# Patient Record
Sex: FEMALE | URBAN - METROPOLITAN AREA
[De-identification: names, ages, dates, MRNs, and addresses within clinical notes are randomized per-mention and may not be internally consistent; named-entity substitution may affect disease eponyms.]

---

## 2017-09-14 ENCOUNTER — OFFICE VISIT (OUTPATIENT)
Dept: FAMILY MEDICINE | Facility: CLINIC | Age: 27
End: 2017-09-14
Payer: COMMERCIAL

## 2017-09-14 VITALS
BODY MASS INDEX: 22.62 KG/M2 | SYSTOLIC BLOOD PRESSURE: 107 MMHG | WEIGHT: 119.8 LBS | RESPIRATION RATE: 14 BRPM | HEIGHT: 61 IN | TEMPERATURE: 98.7 F | HEART RATE: 85 BPM | DIASTOLIC BLOOD PRESSURE: 75 MMHG | OXYGEN SATURATION: 100 %

## 2017-09-14 DIAGNOSIS — R10.32 LLQ ABDOMINAL PAIN: ICD-10-CM

## 2017-09-14 DIAGNOSIS — K21.9 GASTROESOPHAGEAL REFLUX DISEASE WITHOUT ESOPHAGITIS: Primary | ICD-10-CM

## 2017-09-14 DIAGNOSIS — K80.80 BILIARY CALCULUS OF OTHER SITE WITHOUT OBSTRUCTION: ICD-10-CM

## 2017-09-14 PROCEDURE — 99214 OFFICE O/P EST MOD 30 MIN: CPT | Performed by: PHYSICIAN ASSISTANT

## 2017-09-14 NOTE — PROGRESS NOTES
SUBJECTIVE:   Carmenza Han is a 26 year old female who presents to clinic today for the following health issues:      Concern - Abdominal Pain  Onset: x 6 months    Description:   LLQ abdominal pain x 6 months, gradual worsening over the past three months.  Pain is intermittent, seems to worse with movement throughout the day, much better with rest.  [place] ago.  Prior to her departure from [facility], she had US of RUQ done which showed  Non obstructing 8 mm gall stone ( she brought imaging with her today).  She was told to follow up on this when she is in [place].  She notes no RUQ pain, only LLQ pain and pain in epigastrium.  She does have associated belching and increased gas, and symptoms of epigastric discomfort do worsen with eating spicy foods.    Intensity: mild    Progression of Symptoms:  intermittent    Accompanying Signs & Symptoms:    Previous history of similar problem:     Precipitating factors:   Worsened by: walking,     Alleviating factors:  Improved by: rest    Therapies Tried and outcome: none          Problem list and histories reviewed & adjusted, as indicated.  Additional history: as documented    There is no problem list on file for this patient.    No past surgical history on file.    Social History   Substance Use Topics     Smoking status: Never Smoker     Smokeless tobacco: Never Used     Alcohol use No     No family history on file.      Current Outpatient Prescriptions   Medication Sig Dispense Refill     ranitidine (ZANTAC) 150 MG tablet Take 1 tablet (150 mg) by mouth At Bedtime 60 tablet 1     No Known Allergies      Reviewed and updated as needed this visit by clinical staff     Reviewed and updated as needed this visit by Provider         ROS:  Constitutional, HEENT, cardiovascular, pulmonary, gi and gu systems are negative, except as otherwise noted.      OBJECTIVE:   /75 (BP Location: Left arm, Patient Position: Chair,  "Cuff Size: Adult Small)  Pulse 85  Temp 98.7  F (37.1  C) (Tympanic)  Resp 14  Ht 5' 0.5\" (1.537 m)  Wt 119 lb 12.8 oz (54.3 kg)  LMP 09/05/2017  SpO2 100%  BMI 23.01 kg/m2  Body mass index is 23.01 kg/(m^2).  GENERAL: healthy, alert and no distress  RESP: lungs clear to auscultation - no rales, rhonchi or wheezes  CV: regular rate and rhythm, normal S1 S2, no S3 or S4, no murmur, click or rub  ABDOMEN: soft, TTP in LUQ, LLQ and epigastrium, no rebound or guarding, no hepatosplenomegaly, no masses and bowel sounds normal  PSYCH: mentation appears normal, affect normal/bright    Diagnostic Test Results:  none     ASSESSMENT/PLAN:     1. LLQ abdominal pain    US results from [facility] reviewed today. On exam, she has very mild diffuse tenderness along LLQ and LUQ and epigastrium, no RUQ TTP or ybarra's sign.  Vitals are stable.  I suspect that the cause of her pain is unrelated to her gallstone and likely secondary to gastritis or GERD.  Today we will start a trial of Zantac x 1 month.  Diet changes also discussed with patient. We will also get an US to follow up with the gallstone to see if there have been any changes ( symptomatic cholelithiasis/biliary colic) that could be related to the patients pain.  She is agreeable    2. Gastroesophageal reflux disease without esophagitis  - ranitidine (ZANTAC) 150 MG tablet; Take 1 tablet (150 mg) by mouth At Bedtime  Dispense: 60 tablet; Refill: 1    3. Biliary calculus of other site without obstruction  - US Abdomen Complete; Future    See Patient Instructions    Leo Dodge PA-C  [facility]  "

## 2017-09-14 NOTE — MR AVS SNAPSHOT
"              After Visit Summary   9/14/2017    Carmenza Han    MRN: [id number]           Patient Information     Date Of Birth          1990        Visit Information        Provider Department      9/14/2017 2:40 PM Leo Dodge PA-C [facility]        Today's Diagnoses     Gastroesophageal reflux disease without esophagitis    -  1    LLQ abdominal pain        Biliary calculus of other site without obstruction           Follow-ups after your visit        Future tests that were ordered for you today     Open Future Orders        Priority Expected Expires Ordered    US Abdomen Complete Routine  9/14/2018 9/14/2017            Who to contact     If you have questions or need follow up information about today's clinic visit or your schedule please contact [facility] directly at [phone].  Normal or non-critical lab and imaging results will be communicated to you by Advanced Image Enhancementhart, letter or phone within 4 business days after the clinic has received the results. If you do not hear from us within 7 days, please contact the clinic through MyChart or phone. If you have a critical or abnormal lab result, we will notify you by phone as soon as possible.  Submit refill requests through DashThis or call your pharmacy and they will forward the refill request to us. Please allow 3 business days for your refill to be completed.          Additional Information About Your Visit        MyChart Information     DashThis lets you send messages to your doctor, view your test results, renew your prescriptions, schedule appointments and more. To sign up, go to [email or web address] . Click on \"Log in\" on the left side of the screen, which will take you to the Welcome page. Then click on \"Sign up Now\" on the right side of the page.     You will be asked to enter the access code listed below, as well as some personal information. Please follow the directions to create " "your username and password.     Your access code is: [email or web address]  Expires: 2017  3:34 PM     Your access code will  in 90 days. If you need help or a new code, please call your [facility] clinic or [phone].        Care EveryWhere ID     This is your Care EveryWhere ID. This could be used by other organizations to access your [facility] medical records  [id number]        Your Vitals Were     Pulse Temperature Respirations Height Last Period Pulse Oximetry    85 98.7  F (37.1  C) (Tympanic) 14 5' 0.5\" (1.537 m) 2017 100%    BMI (Body Mass Index)                   23.01 kg/m2            Blood Pressure from Last 3 Encounters:   17 107/75    Weight from Last 3 Encounters:   17 119 lb 12.8 oz (54.3 kg)                 Today's Medication Changes          These changes are accurate as of: 17  3:34 PM.  If you have any questions, ask your nurse or doctor.               Start taking these medicines.        Dose/Directions    ranitidine 150 MG tablet   Commonly known as:  ZANTAC   Used for:  Gastroesophageal reflux disease without esophagitis   Started by:  Leo Dodge PA-C        Dose:  150 mg   Take 1 tablet (150 mg) by mouth At Bedtime   Quantity:  60 tablet   Refills:  1            Where to get your medicines      These medications were sent to [facility]/pharmacy #[id number] - [place], [place] - [address]  [address] [place] [address] [place] [address]     Phone:  [phone]     ranitidine 150 MG tablet                Primary Care Provider Office Phone # Fax #    Leo Dodge PA-C [phone]       [address]  [place] [address]        Equal Access to Services     [facility]: Hadii attila starkso Sojaya, waaxda luqadaha, qaybta kaalmada adeegyada, parker sanchez. So [facility] [phone].    ATENCIÓN: Si habla español, tiene a villalobos disposición servicios gratuitos de asistencia lingüística. Llame al " [phone].    We comply with applicable federal civil rights laws and [place] laws. We do not discriminate on the basis of race, color, national origin, age, disability sex, sexual orientation or gender identity.            Thank you!     Thank you for choosing [facility] [place]  for your care. Our goal is always to provide you with excellent care. Hearing back from our patients is one way we can continue to improve our services. Please take a few minutes to complete the written survey that you may receive in the mail after your visit with us. Thank you!             Your Updated Medication List - Protect others around you: Learn how to safely use, store and throw away your medicines at www.disposemymeds.org.          This list is accurate as of: 9/14/17  3:34 PM.  Always use your most recent med list.                   Brand Name Dispense Instructions for use Diagnosis    ranitidine 150 MG tablet    ZANTAC    60 tablet    Take 1 tablet (150 mg) by mouth At Bedtime    Gastroesophageal reflux disease without esophagitis

## 2017-09-21 ENCOUNTER — TELEPHONE (OUTPATIENT)
Dept: FAMILY MEDICINE | Facility: CLINIC | Age: 27
End: 2017-09-21

## 2017-09-21 ENCOUNTER — TRANSFERRED RECORDS (OUTPATIENT)
Dept: HEALTH INFORMATION MANAGEMENT | Facility: CLINIC | Age: 27
End: 2017-09-21

## 2017-09-21 DIAGNOSIS — K80.20 CALCULUS OF GALLBLADDER WITHOUT CHOLECYSTITIS WITHOUT OBSTRUCTION: Primary | ICD-10-CM

## 2017-09-22 NOTE — TELEPHONE ENCOUNTER
US shows an 11 mm focus in the gallbladder likely representing a stone vs. A polyp.  She also had imaging done [place] which was more convincing for gallbladder stone. Radiology is recommending US follow up in 6-12 months. With her abdominal pain, I think it is reasonable for her to consult with a surgeon for symptomatic cholelithiasis.  Please inform her of these results and information about surgical referral.  Please let me know if she has any questions

## 2017-09-22 NOTE — TELEPHONE ENCOUNTER
Patient informed and agreed with plan.  Referral information given  Patient to call [facility] for report and US imaging.  No further questions at this time    Your provider has referred you to: DARRYN: [facility] [place] [phone]   [email or web address]    Lisa Garcia RN

## 2017-09-22 NOTE — TELEPHONE ENCOUNTER
Attempt to reach patient. Received voice mail but unable to leave message.  Will need to try again.  Em Cuellar RN - Triage  [facility]

## 2017-09-25 ENCOUNTER — OFFICE VISIT (OUTPATIENT)
Dept: SURGERY | Facility: CLINIC | Age: 27
End: 2017-09-25
Payer: COMMERCIAL

## 2017-09-25 VITALS
OXYGEN SATURATION: 100 % | HEART RATE: 82 BPM | BODY MASS INDEX: 23.36 KG/M2 | WEIGHT: 119 LBS | DIASTOLIC BLOOD PRESSURE: 78 MMHG | HEIGHT: 60 IN | SYSTOLIC BLOOD PRESSURE: 132 MMHG

## 2017-09-25 DIAGNOSIS — R10.12 LUQ ABDOMINAL PAIN: Primary | ICD-10-CM

## 2017-09-25 PROCEDURE — 99203 OFFICE O/P NEW LOW 30 MIN: CPT | Performed by: SURGERY

## 2017-09-25 ASSESSMENT — ENCOUNTER SYMPTOMS: ABDOMINAL PAIN: 1

## 2017-09-25 NOTE — PROGRESS NOTES
Chief complaint:  Abdominal pain    HPI:  This patient is a 26 year old  female who presents with left upper quadrant left lower quadrant abdominal pain.  The pain is intermittent.  She recently came here from [place] and in [place] was diagnosed with an 8 mm gallstone on ultrasound and was told to have it removed after she arrived here.  Her symptoms however were primarily left lower and left upper quadrant pain and somewhat in the epigastrium but never in the right upper quadrant.  She was started on medication for possible gastritis/GERD as her symptoms didn't correlate well with cholelithiasis/biliary colic.  She reports that she's had substantial symptom improvement on medical therapy.  She understands that her symptoms don't correlate well with gallbladder disease.  She believes that her symptoms are related to stress or possibly to carrying her 2-year-old child which she normally does on her left side.  She is here with her  and daughter    Past Medical History:   has no past medical history on file.    Past Surgical History:  No past surgical history on file. no abdominal surgery     Social History:  Social History     Social History     Marital status:      Spouse name: N/A     Number of children: N/A     Years of education: N/A     Occupational History     Not on file.     Social History Main Topics     Smoking status: Never Smoker     Smokeless tobacco: Never Used     Alcohol use No     Drug use: No     Sexual activity: No     Other Topics Concern     Not on file     Social History Narrative     No narrative on file        Family History:  No family history on file. no family history of gallbladder disease    Review of Systems:  The 10 point Review of Systems is negative other than noted in the HPI and above.    Physical Exam:  General - This is a well developed, well nourished female in no apparent distress.  HEENT - Normocephalic, atraumatic.  NO scleral icterus.  Neck - supple without  "masses  Lungs - respirations regular and non-labored.    Abdomen:   soft, non-distended with possibly slight tenderness noted in the left upper quadrant and left mid abdomen. no masses palpated. .  Extremities - warm without edema  Neurologic - nonfocal    Relevant labs:  None    Imaging:  Ultrasound RUQ:\" immobile, echogenic, non-shadowing mass in GB stone vs tumefactive sludge vs polyp\", negative gallbladder wall thickening, negative ductal dilatation (2mm), negative pericholecystic fluid, normal liver.  Previous ultrasound in [place] shows the stone to be mobile within the lumen of the gallbladder (not consistent with a polyp)    Assessment and Plan:  It is my impression that she has tumefactive sludge versus a stone but no clear symptoms related to it.  We did discuss the possibility that this represents a polyp and that polyps over 1 cm are considered for removal due to increased risk for malignancy.  Since her symptoms are much improved on medicine for gastritis/PUD she is not interested in pursuing cholecystectomy.  She understands that radiology has recommended a follow-up ultrasound in 6-12 months to check on this polyp/sludge/ gallstone.  I have offered her a Laparoscopic cholecystectomy with cholangiograms.  We have discussed the indication, risks and expected recovery.  Risks discussed included duct/ organ injury, conversion to open surgery with increased recovery, post cholecystectomy syndromes and their treatment.  He understands that cholecystectomy may not resolve her symptoms.   Literature was given to review.  She'll contact her primary care physician to see whether further evaluation can be arranged through gastroenterology, possibly including EGD.  I also discussed the alternative of CT scanning to evaluate for her abdominal pain.    Xander Nagel MD  [facility] [place]    Please route or send letter to:  Primary Care Provider (PCP) and Include Progress Note  "

## 2017-09-25 NOTE — MR AVS SNAPSHOT
"              After Visit Summary   2017    Carmenza Han    MRN: [id number]           Patient Information     Date Of Birth          1990        Visit Information        Provider Department      2017 1:30 PM Xander Nagel MD [facility] General Surgery      Today's Diagnoses     LUQ abdominal pain    -  1       Follow-ups after your visit        Who to contact     If you have questions or need follow up information about today's clinic visit or your schedule please contact [facility] directly at [phone].  Normal or non-critical lab and imaging results will be communicated to you by Pruffihart, letter or phone within 4 business days after the clinic has received the results. If you do not hear from us within 7 days, please contact the clinic through ShwrÃ¼mt or phone. If you have a critical or abnormal lab result, we will notify you by phone as soon as possible.  Submit refill requests through gloStream or call your pharmacy and they will forward the refill request to us. Please allow 3 business days for your refill to be completed.          Additional Information About Your Visit        MyChart Information     gloStream lets you send messages to your doctor, view your test results, renew your prescriptions, schedule appointments and more. To sign up, go to [email or web address] . Click on \"Log in\" on the left side of the screen, which will take you to the Welcome page. Then click on \"Sign up Now\" on the right side of the page.     You will be asked to enter the access code listed below, as well as some personal information. Please follow the directions to create your username and password.     Your access code is: [email or web address]  Expires: 2017  3:34 PM     Your access code will  in 90 days. If you need help or a new code, please call your [place] clinic or [phone].        Care EveryWhere ID     " This is your Care EveryWhere ID. This could be used by other organizations to access your [facility] medical records  [id number]        Your Vitals Were     Pulse Height Last Period Pulse Oximetry BMI (Body Mass Index)       82 5' (1.524 m) 09/05/2017 100% 23.24 kg/m2        Blood Pressure from Last 3 Encounters:   09/25/17 132/78   09/14/17 107/75    Weight from Last 3 Encounters:   09/25/17 119 lb (54 kg)   09/14/17 119 lb 12.8 oz (54.3 kg)              Today, you had the following     No orders found for display       Primary Care Provider Office Phone # Fax #    Leo Dodge PA-C [phone]       [address] [place] [facility] [place] [address]        Equal Access to Services     [facility]: Hadii aad ku hadasho Soomaali, waaxda luqadaha, qaybta kaalmada adeegyada, waxshira dobbs haybetsey ya . So [facility] [phone].    ATENCIÓN: Si habla español, tiene a villalobos disposición servicios gratuitos de asistencia lingüística. Talon al [phone].    We comply with applicable federal civil rights laws and [place] laws. We do not discriminate on the basis of race, color, national origin, age, disability sex, sexual orientation or gender identity.            Thank you!     Thank you for choosing [facility] [place]  for your care. Our goal is always to provide you with excellent care. Hearing back from our patients is one way we can continue to improve our services. Please take a few minutes to complete the written survey that you may receive in the mail after your visit with us. Thank you!             Your Updated Medication List - Protect others around you: Learn how to safely use, store and throw away your medicines at www.disposemymeds.org.          This list is accurate as of: 9/25/17  2:09 PM.  Always use your most recent med list.                   Brand Name Dispense Instructions for use Diagnosis    ranitidine 150 MG tablet    ZANTAC    60 tablet    Take 1 tablet  (150 mg) by mouth At Bedtime    Gastroesophageal reflux disease without esophagitis

## 2017-09-25 NOTE — LETTER
"2017    RE:  Carmenza Han-:  [birth year]    Chief complaint:  Abdominal pain     HPI:  This patient is a 26 year old  female who presents with left upper quadrant left lower quadrant abdominal pain.  The pain is intermittent.  She recently came here from [place] and in [place] was diagnosed with an 8 mm gallstone on ultrasound and was told to have it removed after she arrived here.  Her symptoms however were primarily left lower and left upper quadrant pain and somewhat in the epigastrium but never in the right upper quadrant.  She was started on medication for possible gastritis/GERD as her symptoms didn't correlate well with cholelithiasis/biliary colic.  She reports that she's had substantial symptom improvement on medical therapy.  She understands that her symptoms don't correlate well with gallbladder disease.  She believes that her symptoms are related to stress or possibly to carrying her 2-year-old child which she normally does on her left side.  She is here with her  and daughter     Past Medical History:  Has no past medical history on file.     No abdominal surgery                   No family history of gallbladder disease     Review of Systems:  The 10 point Review of Systems is negative other than noted in the HPI and above.     Physical Exam:  General - This is a well developed, well nourished female in no apparent distress.  HEENT - Normocephalic, atraumatic.  NO scleral icterus.  Neck - supple without masses  Lungs - respirations regular and non-labored.    Abdomen:                        soft, non-distended with possibly slight tenderness noted in the left upper quadrant and left mid abdomen. no masses palpated. .  Extremities - warm without edema  Neurologic - nonfocal     Relevant labs:  None     Imaging:  Ultrasound RUQ:\" immobile, echogenic, non-shadowing mass in GB stone vs tumefactive sludge vs polyp\", negative gallbladder wall thickening, negative ductal " dilatation (2mm), negative pericholecystic fluid, normal liver.  Previous ultrasound in [place] shows the stone to be mobile within the lumen of the gallbladder (not consistent with a polyp)     Assessment and Plan:  It is my impression that she has tumefactive sludge versus a stone but no clear symptoms related to it.  We did discuss the possibility that this represents a polyp and that polyps over 1 cm are considered for removal due to increased risk for malignancy.  Since her symptoms are much improved on medicine for gastritis/PUD she is not interested in pursuing cholecystectomy.  She understands that radiology has recommended a follow-up ultrasound in 6-12 months to check on this polyp/sludge/ gallstone.  I have offered her a Laparoscopic cholecystectomy with cholangiograms.  We have discussed the indication, risks and expected recovery.  Risks discussed included duct/ organ injury, conversion to open surgery with increased recovery, post cholecystectomy syndromes and their treatment.  He understands that cholecystectomy may not resolve her symptoms.   Literature was given to review.  She'll contact her primary care physician to see whether further evaluation can be arranged through gastroenterology, possibly including EGD.  I also discussed the alternative of CT scanning to evaluate for her abdominal pain.     Xander Nagel MD  [facility] [place]

## 2017-10-10 ENCOUNTER — OFFICE VISIT (OUTPATIENT)
Dept: FAMILY MEDICINE | Facility: CLINIC | Age: 27
End: 2017-10-10
Payer: COMMERCIAL

## 2017-10-10 VITALS
DIASTOLIC BLOOD PRESSURE: 74 MMHG | TEMPERATURE: 98.8 F | HEIGHT: 60 IN | BODY MASS INDEX: 23.25 KG/M2 | HEART RATE: 78 BPM | RESPIRATION RATE: 16 BRPM | OXYGEN SATURATION: 100 % | WEIGHT: 118.4 LBS | SYSTOLIC BLOOD PRESSURE: 105 MMHG

## 2017-10-10 DIAGNOSIS — K21.9 GASTROESOPHAGEAL REFLUX DISEASE WITHOUT ESOPHAGITIS: Primary | ICD-10-CM

## 2017-10-10 PROCEDURE — 99213 OFFICE O/P EST LOW 20 MIN: CPT | Performed by: PHYSICIAN ASSISTANT

## 2017-10-10 NOTE — MR AVS SNAPSHOT
After Visit Summary   10/10/2017    Carmenza Han    MRN: [id number]           Patient Information     Date Of Birth          1990        Visit Information        Provider Department      10/10/2017 1:40 PM Leo Dodge PA-C [facility]        Today's Diagnoses     Gastroesophageal reflux disease without esophagitis    -  1      Care Instructions                Heartburn/GERD   What is heartburn?   Heartburn refers to the symptoms you feel when acids in your stomach flow back into the esophagus. (The esophagus is the tube that carries food from your throat to your stomach.) This backward movement of stomach acid is called reflux. The acid can burn and irritate the esophagus, throat, and vocal cords.   Heartburn is a common problem. Despite its name, it has nothing to do with the heart.   When you have heartburn often, you may have a condition called gastroesophageal reflux disease, or GERD.   How does it occur?   At the bottom of the esophagus there is a ring of muscle called a sphincter. It acts like a valve. When you swallow food, the sphincter opens to let the food pass into the stomach. The ring then closes to keep the stomach contents from going back into the esophagus. If the sphincter is weak or too relaxed, stomach acid and food flow backward into the esophagus. Because the esophagus does not have the protective lining that the stomach has, the acid causes pain.   The sphincter muscle sometimes does not work properly if:   You are overweight.   You are pregnant.   You have a hiatal hernia (a condition in which part of the stomach protrudes through the diaphragm into the chest).   You eat too much.   You lie down soon after eating.   You wear tight clothes that push on your stomach.   Foods that may make heartburn worse are:   foods high in fat   sugar   chocolate   peppermint   onions   citrus foods such as orange juice   tomato-based foods    spicy foods   coffee and other drinks with caffeine, such as tea and margaret   alcohol.   Heartburn can also be made worse by:   taking certain medicines, such as aspirin   smoking cigarettes.   Anyone can have an attack of heartburn from overeating or eating foods that are high in acid. Most of the time heartburn is mild and lasts for a short time. There is usually not a problem when heartburn occurs just once in a while. You should see your healthcare provider if:   You have heartburn nearly every day for 2 weeks.   The heartburn comes back when the antacid wears off.   Heartburn wakes you up at night.   What are the symptoms?   The main symptom of heartburn is a burning pain in the lower chest, usually close to the bottom of the breastbone. Other symptoms you may have are:   acid or sour taste in your mouth   belching   a feeling of bloating or fullness in the stomach.   These symptoms tend to happen after very large meals and especially with activity such as bending or lifting after meals. The symptoms may be made worse by lying down or by wearing tight clothing.   Heartburn is very common during the last few months of pregnancy. The weight of the baby pushes on the stomach and can cause the sphincter to relax and let acid to flow back into the esophagus.   How is it diagnosed?   Usually heartburn can be diagnosed from your medical history.   If there is any question about the diagnosis, you may have the following tests to check for ulcers or other problems that might cause your symptoms:   barium swallow X-ray study of the esophagus   complete upper GI (gastrointestinal) barium X-ray study of the esophagus, stomach, and upper intestine   endoscopy, a procedure in which a thin flexible tube with a tiny camera is placed in your mouth and down into your stomach so your provider can see your esophagus and stomach.   How is it treated?   To help reduce the symptoms of heartburn you can:   Try not to put a lot of  pressure on the sphincter muscle. Eating light meals and wearing loose clothing will help.   Lose weight if you are overweight.   Take nonprescription antacids (tablets or liquid) after meals and at bedtime.   Raise the head of your bed or use more than one pillow so your head is higher than your stomach. This may allow gravity to help keep food from backing up.   If you find that certain foods or drinks seem to cause your symptoms or make them worse, avoid those foods.   If the simple measures described above do not relieve the symptoms, your healthcare provider may prescribe medicine. The prescription medicines help reduce stomach acid. They also help stomach emptying. A very few people who are not helped with medicines may need surgery.   Get emergency care if the following symptoms occur with the heartburn and do not go away within 15 minutes of treatment for heartburn: shortness of breath; sweating; light-headedness, weakness; or jaw, arm, back, or chest pain.   How long will the effects last?   Heartburn symptoms are usually relieved by treatment in just a few hours or less. If you are having heartburn every day, starting treatment will usually relieve the symptoms in a few days. However, the symptoms may come back from time to time, especially if you gain weight.   Heartburn can sometimes make asthma worse. If you have asthma, preventing or controlling heartburn may help control your asthma symptoms.   How can I help prevent heartburn?   The best prevention is to:   Keep a healthy weight. Lose weight if you are overweight.   Sleep with your head elevated at least 4 to 6 inches. (It's usually most comfortable to put the head of your bed on blocks.)   It may also help if you:   Wait an hour or longer after eating before you lie down. If you have to lie down after a meal, lie on your left side. Keep your head and shoulders slightly higher than the rest of your body. It's best to not eat for 2 to 3 hours before  "you go to bed.   Eat smaller, more frequent meals.   Avoid wearing tight clothing or belts.   Don't smoke. Smoking relaxes the sphincter leading to your stomach.   Avoid foods and other things that seem to cause heartburn or make it worse.   Developed by [employer].   Published by [employer].   Last modified: 2009   Last reviewed: 2008             Follow-ups after your visit        Who to contact     If you have questions or need follow up information about today's clinic visit or your schedule please contact [facility] [place] directly at [phone].  Normal or non-critical lab and imaging results will be communicated to you by soneshart, letter or phone within 4 business days after the clinic has received the results. If you do not hear from us within 7 days, please contact the clinic through [employer] or phone. If you have a critical or abnormal lab result, we will notify you by phone as soon as possible.  Submit refill requests through [employer] or call your pharmacy and they will forward the refill request to us. Please allow 3 business days for your refill to be completed.          Additional Information About Your Visit        MyChart Information     [employer] lets you send messages to your doctor, view your test results, renew your prescriptions, schedule appointments and more. To sign up, go to [email or web address] . Click on \"Log in\" on the left side of the screen, which will take you to the Welcome page. Then click on \"Sign up Now\" on the right side of the page.     You will be asked to enter the access code listed below, as well as some personal information. Please follow the directions to create your username and password.     Your access code is: [email or web address]  Expires: 2017  3:34 PM     Your access code will  in 90 days. If you need help or a new code, please call your [place] clinic or [phone].        Care EveryWhere ID     This is your Care EveryWhere ID. This " could be used by other organizations to access your [place] medical records  [id number]        Your Vitals Were     Pulse Temperature Respirations Height Last Period Pulse Oximetry    78 98.8  F (37.1  C) (Tympanic) 16 5' (1.524 m) 10/05/2017 100%    BMI (Body Mass Index)                   23.12 kg/m2            Blood Pressure from Last 3 Encounters:   10/10/17 105/74   09/25/17 132/78   09/14/17 107/75    Weight from Last 3 Encounters:   10/10/17 118 lb 6.4 oz (53.7 kg)   09/25/17 119 lb (54 kg)   09/14/17 119 lb 12.8 oz (54.3 kg)              Today, you had the following     No orders found for display         Where to get your medicines      These medications were sent to [facility]/pharmacy #[id number] - [place], [place] - [address]  [address], [place] [address]     Phone:  [phone]     ranitidine 150 MG tablet          Primary Care Provider Office Phone # Fax #    Leo Dodge PA-C [phone]       [address]  [place] [address]        Equal Access to Services     [facility]: Hadii aad ku hadasho Soomaali, waaxda luqadaha, qaybta kaalmada adeegyada, waxay idiin hayjossien [place] ya . So [facility] [phone].    ATENCIÓN: Si habla español, tiene a villalobos disposición servicios gratuitos de asistencia lingüística. Llame al [phone].    We comply with applicable federal civil rights laws and [place] laws. We do not discriminate on the basis of race, color, national origin, age, disability, sex, sexual orientation, or gender identity.            Thank you!     Thank you for choosing [facility] [place]  for your care. Our goal is always to provide you with excellent care. Hearing back from our patients is one way we can continue to improve our services. Please take a few minutes to complete the written survey that you may receive in the mail after your visit with us. Thank you!             Your Updated Medication List - Protect others  around you: Learn how to safely use, store and throw away your medicines at www.disposemymeds.org.          This list is accurate as of: 10/10/17  1:51 PM.  Always use your most recent med list.                   Brand Name Dispense Instructions for use Diagnosis    ranitidine 150 MG tablet    ZANTAC    90 tablet    Take 1 tablet (150 mg) by mouth At Bedtime    Gastroesophageal reflux disease without esophagitis

## 2017-10-10 NOTE — PROGRESS NOTES
SUBJECTIVE:   Carmenza Han is a 26 year old female who presents to clinic today for the following health issues:      Follow Up For Abdominal Pain  Onset: Last OV 9/14/2017    Description:   Was seen by general surgery to evaluate persistent cholelithiasis seen on RUQ on.  She has been experiencing left sided and epigastric abdominal pain, increased belching and association with eating spicy foods.  She was started on zantac 150 mg at our last visit.  Per gen surg, she was offered lap beatriz to see if this would help given the finding of a stone vs. Polyp, but declined as pain was improving with zantac.  She returns today, noting that her GERD symptoms have been greatly improved and she is essentially now asymptomatic.  Would like a refill today.  Planning to have repeat US in 6 months as per surgery recs.    Intensity: mild    Progression of Symptoms:  improving    Accompanying Signs & Symptoms:  See above    Previous history of similar problem:   yes    Precipitating factors:   Worsened by: see above    Alleviating factors:  Improved by: see above    Therapies Tried and outcome: Zantac- helps          Problem list and histories reviewed & adjusted, as indicated.  Additional history: as documented    Patient Active Problem List   Diagnosis     Gastroesophageal reflux disease without esophagitis     No past surgical history on file.    Social History   Substance Use Topics     Smoking status: Never Smoker     Smokeless tobacco: Never Used     Alcohol use No     Family History   Problem Relation Age of Onset     DIABETES Mother      Hypertension Father          Current Outpatient Prescriptions   Medication Sig Dispense Refill     ranitidine (ZANTAC) 150 MG tablet Take 1 tablet (150 mg) by mouth At Bedtime 90 tablet 1     No Known Allergies      Reviewed and updated as needed this visit by clinical staff     Reviewed and updated as needed this visit by Provider         ROS:  Constitutional, HEENT,  cardiovascular, pulmonary, gi and gu systems are negative, except as otherwise noted.      OBJECTIVE:   /74 (BP Location: Right arm, Patient Position: Chair, Cuff Size: Adult Regular)  Pulse 78  Temp 98.8  F (37.1  C) (Tympanic)  Resp 16  Ht 5' (1.524 m)  Wt 118 lb 6.4 oz (53.7 kg)  LMP 10/05/2017  SpO2 100%  BMI 23.12 kg/m2  Body mass index is 23.12 kg/(m^2).  GENERAL: healthy, alert and no distress  PSYCH: mentation appears normal, affect normal/bright    Diagnostic Test Results:  none     ASSESSMENT/PLAN:       1. Gastroesophageal reflux disease without esophagitis  - ranitidine (ZANTAC) 150 MG tablet; Take 1 tablet (150 mg) by mouth At Bedtime  Dispense: 90 tablet; Refill: 1    See Patient Instructions    Leo Dodge PA-C  [facility] [place]

## 2017-10-10 NOTE — PATIENT INSTRUCTIONS
Heartburn/GERD   What is heartburn?   Heartburn refers to the symptoms you feel when acids in your stomach flow back into the esophagus. (The esophagus is the tube that carries food from your throat to your stomach.) This backward movement of stomach acid is called reflux. The acid can burn and irritate the esophagus, throat, and vocal cords.   Heartburn is a common problem. Despite its name, it has nothing to do with the heart.   When you have heartburn often, you may have a condition called gastroesophageal reflux disease, or GERD.   How does it occur?   At the bottom of the esophagus there is a ring of muscle called a sphincter. It acts like a valve. When you swallow food, the sphincter opens to let the food pass into the stomach. The ring then closes to keep the stomach contents from going back into the esophagus. If the sphincter is weak or too relaxed, stomach acid and food flow backward into the esophagus. Because the esophagus does not have the protective lining that the stomach has, the acid causes pain.   The sphincter muscle sometimes does not work properly if:   You are overweight.   You are pregnant.   You have a hiatal hernia (a condition in which part of the stomach protrudes through the diaphragm into the chest).   You eat too much.   You lie down soon after eating.   You wear tight clothes that push on your stomach.   Foods that may make heartburn worse are:   foods high in fat   sugar   chocolate   peppermint   onions   citrus foods such as orange juice   tomato-based foods   spicy foods   coffee and other drinks with caffeine, such as tea and margaret   alcohol.   Heartburn can also be made worse by:   taking certain medicines, such as aspirin   smoking cigarettes.   Anyone can have an attack of heartburn from overeating or eating foods that are high in acid. Most of the time heartburn is mild and lasts for a short time. There is usually not a problem when heartburn occurs just once in a  while. You should see your healthcare provider if:   You have heartburn nearly every day for 2 weeks.   The heartburn comes back when the antacid wears off.   Heartburn wakes you up at night.   What are the symptoms?   The main symptom of heartburn is a burning pain in the lower chest, usually close to the bottom of the breastbone. Other symptoms you may have are:   acid or sour taste in your mouth   belching   a feeling of bloating or fullness in the stomach.   These symptoms tend to happen after very large meals and especially with activity such as bending or lifting after meals. The symptoms may be made worse by lying down or by wearing tight clothing.   Heartburn is very common during the last few months of pregnancy. The weight of the baby pushes on the stomach and can cause the sphincter to relax and let acid to flow back into the esophagus.   How is it diagnosed?   Usually heartburn can be diagnosed from your medical history.   If there is any question about the diagnosis, you may have the following tests to check for ulcers or other problems that might cause your symptoms:   barium swallow X-ray study of the esophagus   complete upper GI (gastrointestinal) barium X-ray study of the esophagus, stomach, and upper intestine   endoscopy, a procedure in which a thin flexible tube with a tiny camera is placed in your mouth and down into your stomach so your provider can see your esophagus and stomach.   How is it treated?   To help reduce the symptoms of heartburn you can:   Try not to put a lot of pressure on the sphincter muscle. Eating light meals and wearing loose clothing will help.   Lose weight if you are overweight.   Take nonprescription antacids (tablets or liquid) after meals and at bedtime.   Raise the head of your bed or use more than one pillow so your head is higher than your stomach. This may allow gravity to help keep food from backing up.   If you find that certain foods or drinks seem to cause  your symptoms or make them worse, avoid those foods.   If the simple measures described above do not relieve the symptoms, your healthcare provider may prescribe medicine. The prescription medicines help reduce stomach acid. They also help stomach emptying. A very few people who are not helped with medicines may need surgery.   Get emergency care if the following symptoms occur with the heartburn and do not go away within 15 minutes of treatment for heartburn: shortness of breath; sweating; light-headedness, weakness; or jaw, arm, back, or chest pain.   How long will the effects last?   Heartburn symptoms are usually relieved by treatment in just a few hours or less. If you are having heartburn every day, starting treatment will usually relieve the symptoms in a few days. However, the symptoms may come back from time to time, especially if you gain weight.   Heartburn can sometimes make asthma worse. If you have asthma, preventing or controlling heartburn may help control your asthma symptoms.   How can I help prevent heartburn?   The best prevention is to:   Keep a healthy weight. Lose weight if you are overweight.   Sleep with your head elevated at least 4 to 6 inches. (It's usually most comfortable to put the head of your bed on blocks.)   It may also help if you:   Wait an hour or longer after eating before you lie down. If you have to lie down after a meal, lie on your left side. Keep your head and shoulders slightly higher than the rest of your body. It's best to not eat for 2 to 3 hours before you go to bed.   Eat smaller, more frequent meals.   Avoid wearing tight clothing or belts.   Don't smoke. Smoking relaxes the sphincter leading to your stomach.   Avoid foods and other things that seem to cause heartburn or make it worse.   Developed by [employer].   Published by [employer].   Last modified: 2009-01-14   Last reviewed: 2008-12-02

## 2018-01-21 ENCOUNTER — HEALTH MAINTENANCE LETTER (OUTPATIENT)
Age: 28
End: 2018-01-21